# Patient Record
Sex: MALE | Race: BLACK OR AFRICAN AMERICAN | HISPANIC OR LATINO | URBAN - METROPOLITAN AREA
[De-identification: names, ages, dates, MRNs, and addresses within clinical notes are randomized per-mention and may not be internally consistent; named-entity substitution may affect disease eponyms.]

---

## 2020-02-21 ENCOUNTER — EMERGENCY (EMERGENCY)
Facility: HOSPITAL | Age: 25
LOS: 1 days | Discharge: HOME | End: 2020-02-21
Attending: EMERGENCY MEDICINE | Admitting: EMERGENCY MEDICINE
Payer: COMMERCIAL

## 2020-02-21 VITALS
SYSTOLIC BLOOD PRESSURE: 146 MMHG | RESPIRATION RATE: 18 BRPM | DIASTOLIC BLOOD PRESSURE: 82 MMHG | OXYGEN SATURATION: 98 % | TEMPERATURE: 99 F | WEIGHT: 190.04 LBS | HEIGHT: 68 IN | HEART RATE: 104 BPM

## 2020-02-21 PROCEDURE — 99285 EMERGENCY DEPT VISIT HI MDM: CPT

## 2020-02-21 RX ORDER — KETOROLAC TROMETHAMINE 30 MG/ML
30 SYRINGE (ML) INJECTION ONCE
Refills: 0 | Status: DISCONTINUED | OUTPATIENT
Start: 2020-02-21 | End: 2020-02-21

## 2020-02-21 RX ADMIN — Medication 30 MILLIGRAM(S): at 23:50

## 2020-02-21 NOTE — ED ADULT TRIAGE NOTE - CHIEF COMPLAINT QUOTE
Patient c/o of 4/10 right testicular pain X 2 days, states can feel a lump in the testicle, no dysuria, fevers, no penile discharge, no hematuria.

## 2020-02-21 NOTE — ED ADULT NURSE NOTE - OBJECTIVE STATEMENT
Pt is a 24y male presented to ED w/ c/o pain to R testicle pain x 2 days. PT states has had a "ball" in right testicle "since I can remember but was always smaller." Pt states pain and swelling  until 2 days ago, pain radiates to lower abdomen. Denies f/chills/n/v. PT denies any recent sexual intercourse, denies any hx of STDS.

## 2020-02-21 NOTE — ED ADULT NURSE NOTE - NSIMPLEMENTINTERV_GEN_ALL_ED
Implemented All Universal Safety Interventions:  [place] to call system. Call bell, personal items and telephone within reach. Instruct patient to call for assistance. Room bathroom lighting operational. Non-slip footwear when patient is off stretcher. Physically safe environment: no spills, clutter or unnecessary equipment. Stretcher in lowest position, wheels locked, appropriate side rails in place.

## 2020-02-22 VITALS
TEMPERATURE: 98 F | SYSTOLIC BLOOD PRESSURE: 137 MMHG | RESPIRATION RATE: 16 BRPM | DIASTOLIC BLOOD PRESSURE: 85 MMHG | HEART RATE: 94 BPM | OXYGEN SATURATION: 98 %

## 2020-02-22 LAB
ALBUMIN SERPL ELPH-MCNC: 4.6 G/DL — SIGNIFICANT CHANGE UP (ref 3.3–5)
ALP SERPL-CCNC: 64 U/L — SIGNIFICANT CHANGE UP (ref 40–120)
ALT FLD-CCNC: 28 U/L — SIGNIFICANT CHANGE UP (ref 10–45)
ANION GAP SERPL CALC-SCNC: 10 MMOL/L — SIGNIFICANT CHANGE UP (ref 5–17)
APPEARANCE UR: CLEAR — SIGNIFICANT CHANGE UP
AST SERPL-CCNC: 21 U/L — SIGNIFICANT CHANGE UP (ref 10–40)
BASOPHILS # BLD AUTO: 0.06 K/UL — SIGNIFICANT CHANGE UP (ref 0–0.2)
BASOPHILS NFR BLD AUTO: 0.6 % — SIGNIFICANT CHANGE UP (ref 0–2)
BILIRUB SERPL-MCNC: 0.4 MG/DL — SIGNIFICANT CHANGE UP (ref 0.2–1.2)
BILIRUB UR-MCNC: NEGATIVE — SIGNIFICANT CHANGE UP
BUN SERPL-MCNC: 17 MG/DL — SIGNIFICANT CHANGE UP (ref 7–23)
CALCIUM SERPL-MCNC: 9.4 MG/DL — SIGNIFICANT CHANGE UP (ref 8.4–10.5)
CHLORIDE SERPL-SCNC: 101 MMOL/L — SIGNIFICANT CHANGE UP (ref 96–108)
CO2 SERPL-SCNC: 24 MMOL/L — SIGNIFICANT CHANGE UP (ref 22–31)
COLOR SPEC: YELLOW — SIGNIFICANT CHANGE UP
CREAT SERPL-MCNC: 0.85 MG/DL — SIGNIFICANT CHANGE UP (ref 0.5–1.3)
DIFF PNL FLD: NEGATIVE — SIGNIFICANT CHANGE UP
EOSINOPHIL # BLD AUTO: 0.27 K/UL — SIGNIFICANT CHANGE UP (ref 0–0.5)
EOSINOPHIL NFR BLD AUTO: 2.6 % — SIGNIFICANT CHANGE UP (ref 0–6)
GLUCOSE SERPL-MCNC: 109 MG/DL — HIGH (ref 70–99)
GLUCOSE UR QL: NEGATIVE — SIGNIFICANT CHANGE UP
HCT VFR BLD CALC: 45.8 % — SIGNIFICANT CHANGE UP (ref 39–50)
HGB BLD-MCNC: 15.3 G/DL — SIGNIFICANT CHANGE UP (ref 13–17)
IMM GRANULOCYTES NFR BLD AUTO: 0.4 % — SIGNIFICANT CHANGE UP (ref 0–1.5)
KETONES UR-MCNC: ABNORMAL MG/DL
LEUKOCYTE ESTERASE UR-ACNC: NEGATIVE — SIGNIFICANT CHANGE UP
LYMPHOCYTES # BLD AUTO: 2.92 K/UL — SIGNIFICANT CHANGE UP (ref 1–3.3)
LYMPHOCYTES # BLD AUTO: 28.6 % — SIGNIFICANT CHANGE UP (ref 13–44)
MCHC RBC-ENTMCNC: 28.7 PG — SIGNIFICANT CHANGE UP (ref 27–34)
MCHC RBC-ENTMCNC: 33.4 GM/DL — SIGNIFICANT CHANGE UP (ref 32–36)
MCV RBC AUTO: 85.8 FL — SIGNIFICANT CHANGE UP (ref 80–100)
MONOCYTES # BLD AUTO: 1.04 K/UL — HIGH (ref 0–0.9)
MONOCYTES NFR BLD AUTO: 10.2 % — SIGNIFICANT CHANGE UP (ref 2–14)
NEUTROPHILS # BLD AUTO: 5.88 K/UL — SIGNIFICANT CHANGE UP (ref 1.8–7.4)
NEUTROPHILS NFR BLD AUTO: 57.6 % — SIGNIFICANT CHANGE UP (ref 43–77)
NITRITE UR-MCNC: NEGATIVE — SIGNIFICANT CHANGE UP
NRBC # BLD: 0 /100 WBCS — SIGNIFICANT CHANGE UP (ref 0–0)
PH UR: 7 — SIGNIFICANT CHANGE UP (ref 5–8)
PLATELET # BLD AUTO: 248 K/UL — SIGNIFICANT CHANGE UP (ref 150–400)
POTASSIUM SERPL-MCNC: 3.8 MMOL/L — SIGNIFICANT CHANGE UP (ref 3.5–5.3)
POTASSIUM SERPL-SCNC: 3.8 MMOL/L — SIGNIFICANT CHANGE UP (ref 3.5–5.3)
PROT SERPL-MCNC: 7.3 G/DL — SIGNIFICANT CHANGE UP (ref 6–8.3)
PROT UR-MCNC: NEGATIVE MG/DL — SIGNIFICANT CHANGE UP
RBC # BLD: 5.34 M/UL — SIGNIFICANT CHANGE UP (ref 4.2–5.8)
RBC # FLD: 12.4 % — SIGNIFICANT CHANGE UP (ref 10.3–14.5)
SODIUM SERPL-SCNC: 135 MMOL/L — SIGNIFICANT CHANGE UP (ref 135–145)
SP GR SPEC: 1.02 — SIGNIFICANT CHANGE UP (ref 1–1.03)
UROBILINOGEN FLD QL: 0.2 E.U./DL — SIGNIFICANT CHANGE UP
WBC # BLD: 10.21 K/UL — SIGNIFICANT CHANGE UP (ref 3.8–10.5)
WBC # FLD AUTO: 10.21 K/UL — SIGNIFICANT CHANGE UP (ref 3.8–10.5)

## 2020-02-22 PROCEDURE — 87491 CHLMYD TRACH DNA AMP PROBE: CPT

## 2020-02-22 PROCEDURE — 99284 EMERGENCY DEPT VISIT MOD MDM: CPT | Mod: 25

## 2020-02-22 PROCEDURE — 87591 N.GONORRHOEAE DNA AMP PROB: CPT

## 2020-02-22 PROCEDURE — 96374 THER/PROPH/DIAG INJ IV PUSH: CPT

## 2020-02-22 PROCEDURE — 85025 COMPLETE CBC W/AUTO DIFF WBC: CPT

## 2020-02-22 PROCEDURE — 76870 US EXAM SCROTUM: CPT

## 2020-02-22 PROCEDURE — 36415 COLL VENOUS BLD VENIPUNCTURE: CPT

## 2020-02-22 PROCEDURE — 76870 US EXAM SCROTUM: CPT | Mod: 26

## 2020-02-22 PROCEDURE — 80053 COMPREHEN METABOLIC PANEL: CPT

## 2020-02-22 PROCEDURE — 81003 URINALYSIS AUTO W/O SCOPE: CPT

## 2020-02-22 RX ADMIN — Medication 30 MILLIGRAM(S): at 00:20

## 2020-02-22 NOTE — ED PROVIDER NOTE - OBJECTIVE STATEMENT
25 y/o healthy M presenting with c/o right testicular pain x 2 days. Denies any injury, penile discharge, lesions or rash. No hx of infections. Pt does note that right testicle is slightly swollen, and reports that he can palpate a "little ball" in his testicle which has since grown in size.

## 2020-02-22 NOTE — ED PROVIDER NOTE - GENITOURINARY, MLM
Right testicle slightly enlarged with palpable solid mass with irregular borders. Tender to touch. No penile discharge, lesions or pain to left testicular pain.

## 2020-02-22 NOTE — ED PROVIDER NOTE - PATIENT PORTAL LINK FT
You can access the FollowMyHealth Patient Portal offered by [facility] by registering at the following website: [email or web address]. By joining [facility]’s FollowMyHealth portal, you will also be able to view your health information using other applications (apps) compatible with our system.

## 2020-02-22 NOTE — ED PROVIDER NOTE - NSFOLLOWUPCLINICS_GEN_ALL_ED_FT
[facility]  Urology  [address] [place], [address]rd [address]  [place], [place] [address]  Phone: [phone]  Fax:   Follow Up Time:

## 2020-02-22 NOTE — ED PROVIDER NOTE - ATTENDING CONTRIBUTION TO CARE
presenting with testicular pain. will obtain stat US. US showed NO torsion but pos for spermatocele. seen by urology.  clinic. stable for dc

## 2020-02-22 NOTE — CONSULT NOTE ADULT - SUBJECTIVE AND OBJECTIVE BOX
24M otherwise healthy p/w 2days right testicular pain 4/10 dull. No voiding issues. Denies dysuria, hematuria, penile discharge. Has had a lump on right testicle for as long as he can remember. The lump has mildly increased in size in the past days. Denies f/c/n/v. Not in pain at time of examination.    Vital Signs Last 24 Hrs  T(C): 37 (21 Feb 2020 23:10), Max: 37 (21 Feb 2020 23:10)  T(F): 98.6 (21 Feb 2020 23:10), Max: 98.6 (21 Feb 2020 23:10)  HR: 104 (21 Feb 2020 23:10) (104 - 104)  BP: 146/82 (21 Feb 2020 23:10) (146/82 - 146/82)  BP(mean): --  RR: 18 (21 Feb 2020 23:10) (18 - 18)  SpO2: 98% (21 Feb 2020 23:10) (98% - 98%)    Gen: NAD  Pulm: Unlabored breathing  Abd: sNTND  : Circumcised phallus, b/l NT descended testes without testicular mass, left epididymis NT w/o masses, right epididymis with hard NT nodule                          15.3   10.21 )-----------( 248      ( 22 Feb 2020 00:09 )             45.8   22 Feb 2020 00:09    135    |  101    |  17     ----------------------------<  109    3.8     |  24     |  0.85     Ca    9.4        22 Feb 2020 00:09    TPro  7.3    /  Alb  4.6    /  TBili  0.4    /  DBili  x      /  AST  21     /  ALT  28     /  AlkPhos  64     22 Feb 2020 00:09    < from: US Testicles (02.22.20 @ 00:15) >  INTERPRETATION:  SCROTAL ULTRASOUND with DUPLEX DOPPLER     INDICATION: Palpable painful mass in right testicle    PRIOR STUDIES: None    TECHNIQUE: Ultrasound and duplex Doppler evaluation of scrotum was performed using grayscale imaging, color flow Doppler, and spectral waveform analysis. Duplex doppler evaluation of the paratesticular veins was performed with the patient in the supine position, both without and with valsalva.    FINDINGS:    RIGHT SCROTUM:    Right testis echogenicity: Normal.  Right testis size: 4.0 x 2.1 x 2.8 cm  Right testis volume: 11.8 ml  Right testicular lesions: None.  Right testis duplex Doppler evaluation: Normal.  Right epididymis: There is a well-circumscribed 1.4 x 1.7 x 1.6 cm cystic lesion with internal echoes within the right epididymal head most consistent with a spermatocele.  Right hydrocele: Small.    Paratesticular veins: Varicocele present (= or >3.0mm)  Supine without valsalva: 1.1 mm  Supine with valsalva: 3.1 mm      LEFT SCROTUM:    Left testis echogenicity: Normal.  Left testis size: 4.2 x 1.9 x 2.1 cm  Left testis volume: 11.0 ml  Left testicular lesions: None.  Left testis duplex Doppler evaluation: Normal.  Left epididymis: Normal.  Left hydrocele: None.    Paratesticular veins: Varicocele present (= or >3.0mm)  Supine without valsalva: 1.2 mm  Supine with valsalva: 3.1mm      IMPRESSION:  There is a well-circumscribed 1.4 x 1.7 x 1.6 cmcystic lesion with internal echoes within the right epididymal head most consistent with a spermatocele.    Bilateral varicoceles.    < end of copied text >

## 2020-02-22 NOTE — ED PROVIDER NOTE - CLINICAL SUMMARY MEDICAL DECISION MAKING FREE TEXT BOX
23 y/o M presenting with testicular pain and palpable small solid mass to right testicle. On exam, testicular erythema with tenderness to touch. Pending doppler, labs, pain control and urology f/u. 25 y/o M presenting with testicular pain and palpable small  mass to right testicle x 2 days. On exam, right testicular erythema with tenderness to touch, palpable small mass with irregular, calcified borders. Pending doppler, labs, pain control and urology f/u.

## 2020-02-22 NOTE — CONSULT NOTE ADULT - ASSESSMENT
24M p/w right possible epididymal cyst vs spermatocele    -f/u [facility] clinic in 1-2 weeks  -NSAIDs, ice to groin for pain control  -return to ED if fevers/chills  -d/w ED staff

## 2020-02-24 LAB
C TRACH RRNA SPEC QL NAA+PROBE: SIGNIFICANT CHANGE UP
N GONORRHOEA RRNA SPEC QL NAA+PROBE: SIGNIFICANT CHANGE UP
SPECIMEN SOURCE: SIGNIFICANT CHANGE UP

## 2020-02-29 DIAGNOSIS — N50.811 RIGHT TESTICULAR PAIN: ICD-10-CM

## 2020-02-29 DIAGNOSIS — N50.89 OTHER SPECIFIED DISORDERS OF THE MALE GENITAL ORGANS: ICD-10-CM

## 2021-03-02 NOTE — ED ADULT NURSE NOTE - NS ED NURSE DC INFO COMPLEXITY
[General Appearance - Well Developed] : well developed [General Appearance - Well Nourished] : well nourished [Eyelids - No Xanthelasma] : the eyelids demonstrated no xanthelasmas [] : no respiratory distress [Respiration, Rhythm And Depth] : normal respiratory rhythm and effort [Heart Rate And Rhythm] : heart rate and rhythm were normal [Heart Sounds] : normal S1 and S2 [Edema] : no peripheral edema present [Bowel Sounds] : normal bowel sounds [Abdomen Soft] : soft [Nail Clubbing] : no clubbing of the fingernails [Cyanosis, Localized] : no localized cyanosis [Skin Color & Pigmentation] : normal skin color and pigmentation [Oriented To Time, Place, And Person] : oriented to person, place, and time [Well Groomed] : well groomed [General Appearance - In No Acute Distress] : no acute distress [Auscultation Breath Sounds / Voice Sounds] : lungs were clear to auscultation bilaterally [Murmurs] : no murmurs present [Affect] : the affect was normal [Mood] : the mood was normal [FreeTextEntry1] : warm peripherally Simple: Patient demonstrates quick and easy understanding/Verbalized Understanding

## 2022-05-05 NOTE — ED ADULT NURSE NOTE - CAS EDN DISCHARGE INTERVENTIONS
Patient: Giovanni Latham  Age: 24 year old Sex: male  MRN: [id number]  Encounter Date: 5/5/2022      History     Chief Complaint   Patient presents with   • Syncope       HPI  This is a 24 year old male who presented to the ED for evaluation of syncope.    Presented emergency department following episode of syncope.  States he was having a routine blood draw completed after they brannon blood sample began feeling lightheaded dizzy visual blurring subsequent syncope did not fall to the floor woke up in the chair shortly thereafter.  No reported seizure-like activity.  Patient denies prior episodes of syncope in the past.  Notes some mild malaise immediately after the episode but now states that he feels at his baseline.  No associated chest pain, palpitations, family history of sudden cardiac death.  No history of cardiac disease or dysrhythmias.    Allergies  ALLERGIES:  No Known Allergies    Current Medications  Discharge Medication List as of 5/5/2022 11:50 AM      CONTINUE these medications which have NOT CHANGED    Details   sulfamethoxazole-trimethoprim (BACTRIM DS) 800-160 MG per tablet Take 1 tablet by mouth 2 times daily for 10 days.Eprescribe, Disp-20 tablet, R-0      ondansetron (ZOFRAN ODT) 4 MG disintegrating tablet Place 1 tablet onto the tongue 3 times daily as needed for Nausea. As needed for nausea/vomiting.Normal, Disp-10 tablet, R-0      ibuprofen (MOTRIN) 200 MG tablet Take 200 mg by mouth every 6 hours as needed for Pain.Historical Med      Cetirizine HCl (ZYRTEC ALLERGY PO) Take by mouth daily as needed. Historical Med             Past Medical History  Past Medical History:   Diagnosis Date   • ADHD (attention deficit hyperactivity disorder)    • Anxiety    • Autism spectrum    • BMI 29.0-29.9,adult    • COVID-19 virus detected 10/22/2020   • Pain of right thumb 04/08/2022    Work injury.        Surgical History  Past Surgical History:   Procedure Laterality Date   • Appendectomy  2012        Social History  Social History     Tobacco Use   • Smoking status: Never Smoker   • Smokeless tobacco: Current User   Vaping Use   • Vaping Use: never used   Substance Use Topics   • Alcohol use: Yes     Comment: Drinks ocassionally   • Drug use: No        Family History  Family History   Problem Relation Age of Onset   • Thyroid Mother    • Anxiety disorder Mother    • Myocardial Infarction Other         Grandparents   • Diabetes Other         Uncle    • Hypertension Other    • Heart disease Other    • Cancer, Prostate Other    • Patient is unaware of any medical problems Father    • Anxiety disorder Brother        Review of Systems  ROS:   Constitutional: No fever, Malaise  Skin: No rash or diaphoresis  HENT: No headaches or Rhinorrhea  Eyes: No vision changes or photophobia   Cardio: No chest pain, palpitations or leg swelling   Respiratory: No cough, wheezing or SOB  GI:  No nausea, vomiting or stool changes  :  No dysuria, hematuria, or increased frequency  MSK: No muscle aches, or joint pain  Neuro: No seizures, positive syncope  Psychiatric: No depression, SI or substance abuse    Physical Exam     Vitals:    05/05/22 1110 05/05/22 1111   BP:  98/47   Patient Position:  Sitting   Pulse:  (!) 54   Resp:  14   Temp: 97.2 °F (36.2 °C)    TempSrc: Temporal    SpO2:  100%   Weight:  78 kg (172 lb)   Height:  5' 6\" (1.676 m)        Constitutional: 24 year old male appears staged age, lying comfortably in bed in NAD, normal color, no cyanosis.     HENT:   Head: Normocephalic and atraumatic.   Eyes: EOMI, PERRL   Cardiovascular: RRR, No murmurs or gallops.  Pulmonary/Chest: BS equal bilaterally. No respiratory distress. No wheezes, rales or chest tenderness.   Abdominal:. Abdomen soft, no tenderness, rebound or guarding.  Back: No midline spinal tenderness, no paraspinal tenderness, no CVA tenderness.           Musculoskeletal: No edema, tenderness or deformity.  Skin: warm and dry. No rash, erythema, pallor  or cyanosis  Psychiatric: normal mood and affect. Behavior is normal.   Neuro: Alert and keenly responsive. Facies symmetric. Able to resist upward eyebrow movement, smile, stick out tongue, raise palate symmetrically with midline uvula. Able to shrug shoulders. PERRLA, EOMI, SILT to forehead below eye and at jawline. Strength full and equal bilaterally in upper and lower extremities. Good finger to nose without evidence significant cerebellar dysfunction.    ED Course      ED Medication Orders (From admission, onward)    None          Procedures    Lab Results     Results for orders placed or performed during the hospital encounter of 05/05/22   ECG   Result Value    Systolic Blood Pressure 117    Diastolic Blood Pressure 55    Ventricular Rate EKG/Min (BPM) 53    Atrial Rate (BPM) 53    GA-Interval (MSEC) 158    QRS-Interval (MSEC) 108    QT-Interval (MSEC) 394    QTc 370    P Axis (Degrees) 51    R Axis (Degrees) 60    T Axis (Degrees) 38    REPORT TEXT      Sinus bradycardia  Incomplete right bundle branch block  Borderline ECG  No previous ECGs available  MILD ST ELEVATION WITH SADDLEBACK IN V2  Reconfirmed by JOAN MARTINI MD ([id number]),  Elen Grimm ([id number]) on 5/5/2022 11:45:23 AM     GLUCOSE, BEDSIDE - POINT OF CARE   Result Value    GLUCOSE, BEDSIDE - POINT OF CARE 109 (H)          Radiology Results     No orders to display       I personally evaluated imaging studies in addition to radiology read.    Medical Decision Making   Patient presented for evaluation following syncopal episode.  Overall does seem a situational immediately following blood draw.  Patient well-appearing on my examination back to baseline ambulatory without difficulty normal neurologic examination.  Normal glucose.  Vital signs normal as well.  Did have some mild bradycardia on arrival.  EKGs obtained does have slight saddleback deformity in V2 although I do not have any priors.  Questionable bruit got a presentation but feel this  is less likely given his situation all syncope with associated possible normal variant in the EKG.  Did discuss with electrophysiology who reviewed and recommends follow-up with them for any recurrent symptoms feel acute dysrhythmic cause to be less likely.  Patient made aware of these findings.  Discharged home.  Return for new or worsening symptoms.    Clinical Impression     ED Diagnosis        Final diagnosis    Syncope, unspecified syncope type                Disposition      Following the above history, physical exam, and studies, the patient was deemed stable and suitable for discharge. The patient was advised to return to the ED for any new or worsening symptomsor any new unusual symptoms arise. Discharge medications, and follow-up instructions were discussed with the patient in detail, who verbalizes understanding. The patient is in agreement and is comfortable with the plan of care.    Discharge 5/5/2022 11:46 AM  Giovanni Latham discharge to home/self care.        Discharge Medication List as of 5/5/2022 11:50 AM          Leonora Cummings MD  [address]  [facility] [place] [address]  [phone]          Ronald Tineo MD  [address] [address]  [phone]    Call   ED follow up for recurrent syncope            Bridger Subramanian MD  05/07/22 0664     IV discontinued, cath removed intact